# Patient Record
Sex: FEMALE | Race: WHITE | NOT HISPANIC OR LATINO
[De-identification: names, ages, dates, MRNs, and addresses within clinical notes are randomized per-mention and may not be internally consistent; named-entity substitution may affect disease eponyms.]

---

## 2019-10-08 ENCOUNTER — APPOINTMENT (OUTPATIENT)
Dept: GERIATRICS | Facility: ASSISTED LIVING FACILITY | Age: 84
End: 2019-10-08
Payer: MEDICARE

## 2019-10-08 PROCEDURE — 90686 IIV4 VACC NO PRSV 0.5 ML IM: CPT

## 2019-10-08 PROCEDURE — G0008: CPT

## 2019-10-09 ENCOUNTER — RECORD ABSTRACTING (OUTPATIENT)
Age: 84
End: 2019-10-09

## 2019-10-09 DIAGNOSIS — Z82.49 FAMILY HISTORY OF ISCHEMIC HEART DISEASE AND OTHER DISEASES OF THE CIRCULATORY SYSTEM: ICD-10-CM

## 2019-10-09 DIAGNOSIS — F17.200 NICOTINE DEPENDENCE, UNSPECIFIED, UNCOMPLICATED: ICD-10-CM

## 2019-10-09 RX ORDER — ACETAMINOPHEN 325 MG/1
325 TABLET, FILM COATED ORAL
Refills: 0 | Status: ACTIVE | COMMUNITY

## 2020-03-03 ENCOUNTER — APPOINTMENT (OUTPATIENT)
Dept: GERIATRICS | Facility: CLINIC | Age: 85
End: 2020-03-03
Payer: MEDICARE

## 2020-03-03 VITALS
TEMPERATURE: 98.9 F | DIASTOLIC BLOOD PRESSURE: 74 MMHG | BODY MASS INDEX: 23.99 KG/M2 | HEIGHT: 63 IN | SYSTOLIC BLOOD PRESSURE: 130 MMHG | OXYGEN SATURATION: 96 % | RESPIRATION RATE: 18 BRPM | HEART RATE: 68 BPM | WEIGHT: 135.4 LBS

## 2020-03-03 PROCEDURE — 99214 OFFICE O/P EST MOD 30 MIN: CPT

## 2020-03-03 RX ORDER — ATORVASTATIN CALCIUM 10 MG/1
10 TABLET, FILM COATED ORAL
Qty: 30 | Refills: 0 | Status: DISCONTINUED | COMMUNITY
Start: 2019-11-07

## 2020-03-03 RX ORDER — NITROFURANTOIN MONOHYDRATE/MACROCRYSTALLINE 25; 75 MG/1; MG/1
100 CAPSULE ORAL
Refills: 0 | Status: DISCONTINUED | COMMUNITY
End: 2020-03-03

## 2020-03-03 RX ORDER — LORATADINE 10 MG
TABLET,DISINTEGRATING ORAL
Refills: 0 | Status: DISCONTINUED | COMMUNITY
End: 2020-03-03

## 2020-03-03 NOTE — ASSESSMENT
[FreeTextEntry1] : send to ER for full eval and treatment\par Dr. Ceron made aware and in agreement\par consistent with pt's goals of care \par staff has left message for pt's hcp\par \par

## 2020-03-03 NOTE — PHYSICAL EXAM
[PERRL With Normal Accommodation] : pupils were equal in size, round, and reactive to light [Sclera] : the sclera and conjunctiva were normal [Normal Oral Mucosa] : normal oral mucosa [Outer Ear] : the ears and nose were normal in appearance [Apical Impulse] : the apical impulse was normal [Heart Rate And Rhythm] : heart rate was normal and rhythm regular [FreeTextEntry1] : dry oral mucosa

## 2020-03-03 NOTE — HISTORY OF PRESENT ILLNESS
[FreeTextEntry1] : pt has had cough x 7 days, not improving\par tested negative for Influenza\par this am very lethargic, no energy, appetite poor\par seeing pt on the memory care unit

## 2020-04-15 ENCOUNTER — APPOINTMENT (OUTPATIENT)
Dept: GERIATRICS | Facility: CLINIC | Age: 85
End: 2020-04-15
Payer: MEDICARE

## 2020-04-15 VITALS
SYSTOLIC BLOOD PRESSURE: 102 MMHG | TEMPERATURE: 98 F | DIASTOLIC BLOOD PRESSURE: 70 MMHG | RESPIRATION RATE: 18 BRPM | HEART RATE: 92 BPM

## 2020-04-15 DIAGNOSIS — Z23 ENCOUNTER FOR IMMUNIZATION: ICD-10-CM

## 2020-04-15 DIAGNOSIS — R09.89 OTHER SPECIFIED SYMPTOMS AND SIGNS INVOLVING THE CIRCULATORY AND RESPIRATORY SYSTEMS: ICD-10-CM

## 2020-04-15 NOTE — ASSESSMENT
[FreeTextEntry1] : Cleanse with NS, apply mupirocin, cover with dpd daily\par d/w Dr. Ceron and updated on same. Discussed poss pcn allergie recorded in 2/2017\par however pt on amox in 9/17 successfully treated. No pcn allergy should be noted

## 2020-04-15 NOTE — HISTORY OF PRESENT ILLNESS
[FreeTextEntry1] : incurred skin tear on 4/9 and has been using xeroform/bacitracin\par \par pt seen in her memory care unit due to adv dementia/ does not leave unit

## 2020-04-15 NOTE — PHYSICAL EXAM
[General Appearance - Alert] : alert [General Appearance - In No Acute Distress] : in no acute distress [Sclera] : the sclera and conjunctiva were normal [PERRL With Normal Accommodation] : pupils were equal in size, round, and reactive to light [Respiration, Rhythm And Depth] : normal respiratory rhythm and effort [Apical Impulse] : the apical impulse was normal [Heart Rate And Rhythm] : heart rate was normal and rhythm regular [Skin Injury 1] : Skin injury: [___cm] : a [unfilled] cm [Location] : which was located [] : on the left leg [Details] : the wound [Erythema] : erythema [Warm] : warmth [FreeTextEntry2] : warmth, serousang drainage, bruising distally to wound [FreeTextEntry3] : erythema extending 3cm proximally to wound towards knee

## 2020-04-22 ENCOUNTER — APPOINTMENT (OUTPATIENT)
Dept: GERIATRICS | Facility: CLINIC | Age: 85
End: 2020-04-22
Payer: MEDICARE

## 2020-04-22 VITALS — TEMPERATURE: 98.4 F | RESPIRATION RATE: 20 BRPM

## 2020-04-22 NOTE — ASSESSMENT
[FreeTextEntry1] : wound improving slowly.. extend oral a/b for 3 more days. Cleanse with NS, apply bactroban, cover wound with telfa,  sher and tubigrip. May remove tubigrip for cares.

## 2020-04-22 NOTE — PHYSICAL EXAM
[General Appearance - Alert] : alert [General Appearance - In No Acute Distress] : in no acute distress [___cm] : a [unfilled] cm [Location] : which was located [] : on the left leg [Details] : the wound [Erythema] : erythema [Warm] : warmth [Skin Injury 1] : Skin injury: [Sclera] : the sclera and conjunctiva were normal [PERRL With Normal Accommodation] : pupils were equal in size, round, and reactive to light [Normal Oral Mucosa] : normal oral mucosa [Full Pulse] : the pedal pulses are present [FreeTextEntry1] : no edema or pain to L foot [FreeTextEntry2] : serousang drainage, wound bed with some slough, skin flap intact to 80% of wound [FreeTextEntry3] : edema and erythema around wound, not warm, bruising has resolved

## 2020-04-27 ENCOUNTER — APPOINTMENT (OUTPATIENT)
Dept: GERIATRICS | Facility: CLINIC | Age: 85
End: 2020-04-27
Payer: MEDICARE

## 2020-04-27 VITALS
BODY MASS INDEX: 23.04 KG/M2 | HEART RATE: 80 BPM | TEMPERATURE: 97.7 F | WEIGHT: 130.06 LBS | RESPIRATION RATE: 18 BRPM | HEIGHT: 63 IN

## 2020-04-28 RX ORDER — CEPHALEXIN 500 MG/1
500 TABLET ORAL 3 TIMES DAILY
Qty: 9 | Refills: 0 | Status: DISCONTINUED | COMMUNITY
Start: 2020-04-22 | End: 2020-04-28

## 2020-04-28 RX ORDER — CEPHALEXIN 500 MG/1
500 CAPSULE ORAL EVERY 8 HOURS
Qty: 21 | Refills: 0 | Status: DISCONTINUED | COMMUNITY
Start: 2020-04-15 | End: 2020-04-28

## 2020-04-28 NOTE — ASSESSMENT
[FreeTextEntry1] : Tubigrip from toes to knee has helped\par continue daily local treatment with bactroban

## 2020-04-28 NOTE — PHYSICAL EXAM
[General Appearance - In No Acute Distress] : in no acute distress [General Appearance - Alert] : alert [Sclera] : the sclera and conjunctiva were normal [PERRL With Normal Accommodation] : pupils were equal in size, round, and reactive to light [Normal Oral Mucosa] : normal oral mucosa [Outer Ear] : the ears and nose were normal in appearance [Respiration, Rhythm And Depth] : normal respiratory rhythm and effort [Apical Impulse] : the apical impulse was normal [Heart Rate And Rhythm] : heart rate was normal and rhythm regular [Skin Injury 1] : Skin injury: [___cm] : a [unfilled] cm [Location] : which was located [] : on the left leg [Details] : the wound [Erythema] : erythema [Warm] : warmth [FreeTextEntry2] : serousang drainage, wound bed with some slough, skin flap intact to 80% of wound [FreeTextEntry3] : resolving edema and erythema

## 2020-04-28 NOTE — HISTORY OF PRESENT ILLNESS
[FreeTextEntry1] : incurred skin tear on 4/9.\par wound became infected and completed keflex from 4/16-4/26, bactroban topical treatment\par \par pt seen in her memory care unit due to adv dementia/ does not leave unit\par

## 2020-05-07 ENCOUNTER — APPOINTMENT (OUTPATIENT)
Dept: GERIATRICS | Facility: CLINIC | Age: 85
End: 2020-05-07
Payer: MEDICARE

## 2020-05-08 VITALS — TEMPERATURE: 97 F

## 2020-05-08 RX ORDER — MUPIROCIN 20 MG/G
2 OINTMENT TOPICAL DAILY
Qty: 1 | Refills: 0 | Status: DISCONTINUED | COMMUNITY
Start: 2020-04-15 | End: 2020-05-08

## 2020-05-08 NOTE — PHYSICAL EXAM
[General Appearance - Alert] : alert [General Appearance - In No Acute Distress] : in no acute distress [Respiration, Rhythm And Depth] : normal respiratory rhythm and effort [Skin Injury 1] : Skin injury: [___cm] : a [unfilled] cm [Location] : which was located [] : on the left leg [Erythema] : erythema [Details] : the wound [Warm] : warmth [FreeTextEntry3] : continues to resolve with some maceration and sl edema w erythrema to periwound [FreeTextEntry2] : nearly healed with scabbing

## 2020-05-08 NOTE — HISTORY OF PRESENT ILLNESS
[FreeTextEntry1] : requested by staff to reevaluate wound, \par wound since 4/15/20 was previously infected\par and pt had 10days Keflex with improvement noted\par

## 2020-05-08 NOTE — ASSESSMENT
[FreeTextEntry1] : d/c bactroban to LLE, now cleanse with NS, apply \par bacitracin cover with DPD, not allevyn due to too moist\par \par Pt seen in her memory care unit due to she does not leave unit

## 2020-05-29 ENCOUNTER — APPOINTMENT (OUTPATIENT)
Dept: GERIATRICS | Facility: CLINIC | Age: 85
End: 2020-05-29
Payer: MEDICARE

## 2020-06-02 NOTE — HISTORY OF PRESENT ILLNESS
[FreeTextEntry1] : wound left toe \par pt seen in her memory care unit\par does not leave due to adv dementia

## 2020-06-02 NOTE — PHYSICAL EXAM
[General Appearance - Alert] : alert [General Appearance - In No Acute Distress] : in no acute distress [General Appearance - Well Nourished] : well nourished [FreeTextEntry1] : lying in bed in her assited living room [General Appearance - Well Developed] : well developed [] : no respiratory distress [PERRL With Normal Accommodation] : pupils were equal in size, round, and reactive to light [Sclera] : the sclera and conjunctiva were normal [Apical Impulse] : the apical impulse was normal [Respiration, Rhythm And Depth] : normal respiratory rhythm and effort [Heart Rate And Rhythm] : heart rate was normal and rhythm regular [Skin Injury 1] : Skin injury: [Location] : which was located [___cm] : a [unfilled] cm [Lesions Left First Toe] : on the left great toe [Lesions Feet] : on the left foot [On The ___] : on the [unfilled] [Details] : the wound [Dry] : dry [FreeTextEntry2] : scabbed and dried, unable to fully visualize wound bed [Tender] : tenderness [FreeTextEntry3] : erythema in periwound not warm but some tenderness

## 2020-06-05 ENCOUNTER — APPOINTMENT (OUTPATIENT)
Dept: GERIATRICS | Facility: CLINIC | Age: 85
End: 2020-06-05
Payer: MEDICARE

## 2020-06-05 VITALS
SYSTOLIC BLOOD PRESSURE: 119 MMHG | HEART RATE: 66 BPM | RESPIRATION RATE: 20 BRPM | DIASTOLIC BLOOD PRESSURE: 74 MMHG | TEMPERATURE: 97.1 F

## 2020-06-05 RX ORDER — DOCUSATE SODIUM 100 MG/1
100 CAPSULE ORAL
Refills: 0 | Status: DISCONTINUED | COMMUNITY
End: 2020-06-05

## 2020-06-05 RX ORDER — MAG HYDROX/ALUMINUM HYD/SIMETH 200-200-20
200-200-20 SUSPENSION, ORAL (FINAL DOSE FORM) ORAL
Refills: 0 | Status: DISCONTINUED | COMMUNITY
End: 2020-06-05

## 2020-06-05 NOTE — ASSESSMENT
[FreeTextEntry1] : Continue same treatment;\par cleanse with NS, apply bactroban cover with dpd daily\par

## 2020-06-05 NOTE — HISTORY OF PRESENT ILLNESS
[FreeTextEntry1] : wound left toe - hs been treated with\par bactroban x 7 days\par pt seen in her memory care unit\par does not leave due to adv dementia

## 2020-06-05 NOTE — PHYSICAL EXAM
[General Appearance - Alert] : alert [General Appearance - In No Acute Distress] : in no acute distress [General Appearance - Well Nourished] : well nourished [General Appearance - Well Developed] : well developed [] : no respiratory distress [Sclera] : the sclera and conjunctiva were normal [Respiration, Rhythm And Depth] : normal respiratory rhythm and effort [Skin Injury 1] : Skin injury: [___cm] : a [unfilled] cm [Location] : which was located [Lesions Feet] : on the left foot [Lesions Left First Toe] : on the left great toe [Details] : the wound [On The ___] : on the [unfilled] [Dry] : dry [Tender] : tenderness [FreeTextEntry3] : periwound improved, no erythema or tenderness [FreeTextEntry2] : smaller and fully granulating

## 2020-07-09 RX ORDER — MULTIVITAMIN
TABLET ORAL DAILY
Qty: 30 | Refills: 0 | Status: ACTIVE | COMMUNITY
Start: 2020-06-04 | End: 1900-01-01

## 2020-10-13 ENCOUNTER — APPOINTMENT (OUTPATIENT)
Dept: GERIATRICS | Facility: CLINIC | Age: 85
End: 2020-10-13
Payer: MEDICARE

## 2020-10-13 PROCEDURE — G0008: CPT

## 2020-10-13 PROCEDURE — 90662 IIV NO PRSV INCREASED AG IM: CPT

## 2021-07-01 ENCOUNTER — APPOINTMENT (OUTPATIENT)
Dept: GERIATRICS | Facility: CLINIC | Age: 86
End: 2021-07-01
Payer: MEDICARE

## 2021-07-01 VITALS — TEMPERATURE: 96.5 F | RESPIRATION RATE: 20 BRPM | HEART RATE: 71 BPM

## 2021-07-01 DIAGNOSIS — S91.102A UNSPECIFIED OPEN WOUND OF LEFT GREAT TOE W/OUT DAMAGE TO NAIL, INITIAL ENCOUNTER: ICD-10-CM

## 2021-07-01 DIAGNOSIS — Z20.89 CONTACT WITH AND (SUSPECTED) EXPOSURE TO OTHER COMMUNICABLE DISEASES: ICD-10-CM

## 2021-07-01 RX ORDER — PERMETHRIN 50 MG/G
5 CREAM TOPICAL
Qty: 1 | Refills: 1 | Status: DISCONTINUED | COMMUNITY
Start: 2021-03-11 | End: 2021-07-01

## 2021-07-01 NOTE — HISTORY OF PRESENT ILLNESS
[FreeTextEntry1] : RLE wound - x 1 wk\par has been treated with xeroform/bacit\par \par Seen in her memory care unit, nurse present

## 2021-07-01 NOTE — PHYSICAL EXAM
[General Appearance - Alert] : alert [General Appearance - In No Acute Distress] : in no acute distress [Sclera] : the sclera and conjunctiva were normal [PERRL With Normal Accommodation] : pupils were equal in size, round, and reactive to light [Skin Injury 1] : Skin injury: [___cm] : a [unfilled] cm [Location] : which was located [Lesions Shin Right] : on the right shin [Details] : the wound [Tender] : tenderness [FreeTextEntry1] : lying in bed, in NAD [] : no respiratory distress [Respiration, Rhythm And Depth] : normal respiratory rhythm and effort [FreeTextEntry2] : 95% granulation 5% slough [FreeTextEntry3] : erythema

## 2021-07-07 ENCOUNTER — APPOINTMENT (OUTPATIENT)
Dept: GERIATRICS | Facility: CLINIC | Age: 86
End: 2021-07-07
Payer: MEDICARE

## 2021-07-07 VITALS — RESPIRATION RATE: 20 BRPM | TEMPERATURE: 97.4 F | HEART RATE: 84 BPM

## 2021-07-07 RX ORDER — CEPHALEXIN 500 MG/1
500 CAPSULE ORAL EVERY 8 HOURS
Qty: 30 | Refills: 0 | Status: COMPLETED | COMMUNITY
Start: 2021-07-07 | End: 2021-07-17

## 2021-07-07 NOTE — HISTORY OF PRESENT ILLNESS
[FreeTextEntry1] : RLE wound -since 6/20\par has been treated with bactroban for 6 days\par \par Seen in her memory care unit, nurse present

## 2021-07-07 NOTE — PHYSICAL EXAM
[General Appearance - Alert] : alert [General Appearance - In No Acute Distress] : in no acute distress [Sclera] : the sclera and conjunctiva were normal [PERRL With Normal Accommodation] : pupils were equal in size, round, and reactive to light [Respiration, Rhythm And Depth] : normal respiratory rhythm and effort [Skin Injury 1] : Skin injury: [___cm] : a [unfilled] cm [Location] : which was located [] : on the right leg [Lesions Shin Right] : on the right shin [Details] : the wound [Serosanguineous Drainage] : serosanguineous drainage [Erythema] : erythema [Tender] : tenderness [FreeTextEntry2] : 50% granulation, 50% slough [FreeTextEntry3] : darker erythema

## 2021-07-22 ENCOUNTER — APPOINTMENT (OUTPATIENT)
Dept: GERIATRICS | Facility: CLINIC | Age: 86
End: 2021-07-22
Payer: MEDICARE

## 2021-07-22 VITALS — TEMPERATURE: 97 F | RESPIRATION RATE: 20 BRPM

## 2021-07-22 DIAGNOSIS — L08.9 PERSONAL HISTORY OF OTHER INFECTIOUS AND PARASITIC DISEASES: ICD-10-CM

## 2021-07-22 DIAGNOSIS — Z86.19 PERSONAL HISTORY OF OTHER INFECTIOUS AND PARASITIC DISEASES: ICD-10-CM

## 2021-07-22 NOTE — ASSESSMENT
[FreeTextEntry1] : Cleansed wound well and removed non-viable skin\par using sterile gauze only. \par Pt tolerated procedure with assistance of nurse on floor\par \par Continue wound care: cleanse with ns, apply bactroban\par cover with dpd daily x 7 more days\par \par f/u with dr. huggins when in unit

## 2021-07-22 NOTE — HISTORY OF PRESENT ILLNESS
[FreeTextEntry1] : RLE wound -since 6/20\par had a course of Keflex and currently being treated with bactroban for past 14 days\par \par Seen in her memory care unit, nurse present

## 2021-07-22 NOTE — PHYSICAL EXAM
[General Appearance - Alert] : alert [General Appearance - In No Acute Distress] : in no acute distress [Sclera] : the sclera and conjunctiva were normal [PERRL With Normal Accommodation] : pupils were equal in size, round, and reactive to light [Skin Injury 1] : Skin injury: [___cm] : a [unfilled] cm [Location] : which was located [] : on the right leg [Lesions Shin Right] : on the right shin [Details] : the wound [Serosanguineous Drainage] : serosanguineous drainage [Tender] : tenderness [FreeTextEntry2] : 75% granulation, 25% slough [FreeTextEntry3] : improved periwound

## 2021-10-28 ENCOUNTER — APPOINTMENT (OUTPATIENT)
Dept: GERIATRICS | Facility: CLINIC | Age: 86
End: 2021-10-28
Payer: MEDICARE

## 2021-10-28 PROCEDURE — G0008: CPT

## 2021-10-28 PROCEDURE — 90662 IIV NO PRSV INCREASED AG IM: CPT

## 2021-10-29 ENCOUNTER — MED ADMIN CHARGE (OUTPATIENT)
Age: 86
End: 2021-10-29

## 2021-11-12 ENCOUNTER — APPOINTMENT (OUTPATIENT)
Dept: GERIATRICS | Facility: CLINIC | Age: 86
End: 2021-11-12
Payer: MEDICARE

## 2021-11-12 DIAGNOSIS — Z23 ENCOUNTER FOR IMMUNIZATION: ICD-10-CM

## 2021-11-12 DIAGNOSIS — M19.90 UNSPECIFIED OSTEOARTHRITIS, UNSPECIFIED SITE: ICD-10-CM

## 2021-11-15 VITALS — TEMPERATURE: 98 F | RESPIRATION RATE: 20 BRPM | HEART RATE: 61 BPM

## 2021-11-15 PROBLEM — Z23 ENCOUNTER FOR IMMUNIZATION: Status: RESOLVED | Noted: 2020-10-13 | Resolved: 2021-11-15

## 2021-11-15 PROBLEM — M19.90 OSTEOARTHRITIS: Status: ACTIVE | Noted: 2021-11-15

## 2021-11-15 RX ORDER — MUPIROCIN 20 MG/G
2 OINTMENT TOPICAL DAILY
Qty: 1 | Refills: 1 | Status: DISCONTINUED | COMMUNITY
Start: 2020-06-02 | End: 2021-11-15

## 2021-11-15 NOTE — ASSESSMENT
[FreeTextEntry1] : possible arthritic flare? \par Pt seen in her memory care home d/t pt's adv dementia,

## 2021-11-15 NOTE — HISTORY OF PRESENT ILLNESS
[FreeTextEntry1] : pt has been limping for 2-3 days on her unit and\par has change in ambulation\par no known trauma, has been c/o RLE discomfort to\par nursing staff who did not notice any wound or bruising\par \par \par

## 2021-11-15 NOTE — PHYSICAL EXAM
[Alert] : alert [No Acute Distress] : in no acute distress [Normal Outer Ear/Nose] : the ears and nose were normal in appearance [Normal Appearance] : the appearance of the neck was normal [Supple] : the neck was supple [No Respiratory Distress] : no respiratory distress [No Acc Muscle Use] : no accessory muscle use [Respiration, Rhythm And Depth] : normal respiratory rhythm and effort [Auscultation Breath Sounds / Voice Sounds] : lungs were clear to auscultation bilaterally [Heart Rate And Rhythm] : heart rate was normal and rhythm regular [Abdomen Soft] : soft [No Spinal Tenderness] : no spinal tenderness [Normal Color / Pigmentation] : normal skin color and pigmentation [Normal Turgor] : normal skin turgor [No Focal Deficits] : no focal deficits [Normal Affect] : the affect was normal [Normal Mood] : the mood was normal [Normal Gait] : normal gait [de-identified] : no pain on palpation of hips, or extremities, no obvious deformities. After exam, patient was able to ambulate to main room with no apparent pain or problem

## 2021-11-16 ENCOUNTER — APPOINTMENT (OUTPATIENT)
Dept: GERIATRICS | Facility: CLINIC | Age: 86
End: 2021-11-16
Payer: MEDICARE

## 2021-11-16 ENCOUNTER — NON-APPOINTMENT (OUTPATIENT)
Age: 86
End: 2021-11-16

## 2021-11-16 VITALS
RESPIRATION RATE: 18 BRPM | TEMPERATURE: 98 F | SYSTOLIC BLOOD PRESSURE: 101 MMHG | DIASTOLIC BLOOD PRESSURE: 70 MMHG | HEART RATE: 71 BPM

## 2021-11-17 NOTE — ASSESSMENT
[FreeTextEntry1] : continue tylenol 650mg po tid x 7 more days\par \par encourage fluids and resume ordinary activies

## 2021-11-17 NOTE — HISTORY OF PRESENT ILLNESS
[FreeTextEntry1] : Pt stayed in bed on 11/15 and ate poorly\par today she is out of bed, still with change in \par ambulation (with rollator) and appearing fatigued.\par \par Seen in her memory unit due to adv dementia

## 2021-12-06 ENCOUNTER — NON-APPOINTMENT (OUTPATIENT)
Age: 86
End: 2021-12-06

## 2021-12-06 ENCOUNTER — APPOINTMENT (OUTPATIENT)
Dept: GERIATRICS | Facility: CLINIC | Age: 86
End: 2021-12-06
Payer: MEDICARE

## 2021-12-06 VITALS
RESPIRATION RATE: 20 BRPM | SYSTOLIC BLOOD PRESSURE: 113 MMHG | DIASTOLIC BLOOD PRESSURE: 79 MMHG | OXYGEN SATURATION: 97 % | HEART RATE: 79 BPM

## 2021-12-06 RX ORDER — ACETAMINOPHEN 325 MG/1
325 TABLET, FILM COATED ORAL EVERY 6 HOURS
Qty: 24 | Refills: 0 | Status: COMPLETED | OUTPATIENT
Start: 2021-12-06 | End: 2021-12-09

## 2021-12-08 ENCOUNTER — APPOINTMENT (OUTPATIENT)
Dept: GERIATRICS | Facility: CLINIC | Age: 86
End: 2021-12-08
Payer: MEDICARE

## 2021-12-08 VITALS
HEIGHT: 63 IN | BODY MASS INDEX: 21.97 KG/M2 | SYSTOLIC BLOOD PRESSURE: 125 MMHG | HEART RATE: 78 BPM | TEMPERATURE: 97.7 F | DIASTOLIC BLOOD PRESSURE: 78 MMHG | WEIGHT: 124 LBS | RESPIRATION RATE: 20 BRPM

## 2021-12-08 NOTE — ASSESSMENT
[FreeTextEntry1] : Keep socks on and cover patient\par Legs elevated as much as possible\par US/venous doppler bilater le ordered\par may benefit from light compression pending\par results of doppler.\par \par d/w pcp Dr. Ceron

## 2021-12-08 NOTE — HISTORY OF PRESENT ILLNESS
[FreeTextEntry1] : pain and swelling of feet since lunchtime\par additionally, pt started limping\par Nurse concerned due to coldness of feet \par Pt does have a history of le edema\par \par No chest pain, no palpitations, no loss of appetite\par no c/o of abdominal pain\par \par Pt seen on her memory care unit due to she does not\par leave unit for dementia

## 2021-12-08 NOTE — PHYSICAL EXAM
[de-identified] : bilateral le edema L pedal pulse wnl, R pedal pulse difficult to palpate [de-identified] : hemisiderin changes in le, shiny thin skin, feet pale, no bluish tinge

## 2021-12-09 NOTE — PHYSICAL EXAM
[Alert] : alert [Normal Outer Ear/Nose] : the ears and nose were normal in appearance [Normal Appearance] : the appearance of the neck was normal [Supple] : the neck was supple [No Respiratory Distress] : no respiratory distress [Respiration, Rhythm And Depth] : normal respiratory rhythm and effort [Heart Rate And Rhythm] : heart rate was normal and rhythm regular [Pitting Edema] : pitting edema present [___ +] : bilateral [unfilled]+ pretibial pitting edema [No Spinal Tenderness] : no spinal tenderness [No Focal Deficits] : no focal deficits [Normal Affect] : the affect was normal [Normal Mood] : the mood was normal [de-identified] : bilateral le edema improved with significantly less fluid in dorsum of bilateral feet.  [de-identified] : hemisiderin changes in le, shiny thin skin, feet pale and warm

## 2021-12-09 NOTE — HISTORY OF PRESENT ILLNESS
[FreeTextEntry1] : follow up edema\par pt had US as ordered and negative for dvt bilaterally\par Pt has been elevating legs and taking tylenol standing dose\par Legs becoming edematous when pt walking and sitting\par

## 2021-12-09 NOTE — ASSESSMENT
[FreeTextEntry1] : Pt still limping but less pain expressed and taking tylenol prn\par Instructed nurses to use light compression with tubigrip size E or F\par on in am off qhs\par Apply A&D ointment to both lower extremities\par \par Seen in her memory unit as she does not leave unit

## 2022-04-14 ENCOUNTER — TRANSCRIPTION ENCOUNTER (OUTPATIENT)
Age: 87
End: 2022-04-14

## 2022-06-09 ENCOUNTER — NON-APPOINTMENT (OUTPATIENT)
Age: 87
End: 2022-06-09

## 2022-06-09 ENCOUNTER — APPOINTMENT (OUTPATIENT)
Dept: GERIATRICS | Facility: CLINIC | Age: 87
End: 2022-06-09
Payer: MEDICARE

## 2022-06-09 VITALS
BODY MASS INDEX: 21.48 KG/M2 | SYSTOLIC BLOOD PRESSURE: 110 MMHG | WEIGHT: 121.25 LBS | TEMPERATURE: 96.8 F | DIASTOLIC BLOOD PRESSURE: 66 MMHG | HEART RATE: 88 BPM | HEIGHT: 63 IN | RESPIRATION RATE: 20 BRPM

## 2022-06-09 VITALS
SYSTOLIC BLOOD PRESSURE: 110 MMHG | HEART RATE: 88 BPM | TEMPERATURE: 98.8 F | DIASTOLIC BLOOD PRESSURE: 66 MMHG | RESPIRATION RATE: 20 BRPM

## 2022-06-09 DIAGNOSIS — M79.671 PAIN IN RIGHT FOOT: ICD-10-CM

## 2022-06-09 DIAGNOSIS — M79.672 PAIN IN LEFT FOOT: ICD-10-CM

## 2022-06-09 DIAGNOSIS — R53.81 OTHER MALAISE: ICD-10-CM

## 2022-06-09 DIAGNOSIS — R53.83 OTHER MALAISE: ICD-10-CM

## 2022-06-09 DIAGNOSIS — M79.672 PAIN IN RIGHT FOOT: ICD-10-CM

## 2022-06-10 NOTE — HISTORY OF PRESENT ILLNESS
[FreeTextEntry1] : Wound incurred on RLE on 5/27\par has been treated with bacitracin and \par xeroform daily\par Pt w fragile skin, adv age and adv dementia\par lives in memory care unit.

## 2022-06-10 NOTE — ASSESSMENT
[FreeTextEntry1] : cleanse w NS, apply bacitracin cover w bandaid daily x 5 days\par then d/c treatment - \par nursing staff informed\par Pt seen on her memory care unit/ does not leave unit/homebound

## 2022-07-22 ENCOUNTER — APPOINTMENT (OUTPATIENT)
Dept: GERIATRICS | Facility: CLINIC | Age: 87
End: 2022-07-22

## 2022-07-22 ENCOUNTER — NON-APPOINTMENT (OUTPATIENT)
Age: 87
End: 2022-07-22

## 2022-07-22 VITALS
SYSTOLIC BLOOD PRESSURE: 109 MMHG | HEART RATE: 78 BPM | RESPIRATION RATE: 20 BRPM | DIASTOLIC BLOOD PRESSURE: 55 MMHG | TEMPERATURE: 98.3 F

## 2022-07-22 VITALS
DIASTOLIC BLOOD PRESSURE: 55 MMHG | HEART RATE: 78 BPM | BODY MASS INDEX: 19.93 KG/M2 | WEIGHT: 112.5 LBS | SYSTOLIC BLOOD PRESSURE: 109 MMHG | HEIGHT: 63 IN | RESPIRATION RATE: 20 BRPM | TEMPERATURE: 98.3 F

## 2022-07-22 DIAGNOSIS — I73.9 PERIPHERAL VASCULAR DISEASE, UNSPECIFIED: ICD-10-CM

## 2022-07-22 DIAGNOSIS — R60.9 EDEMA, UNSPECIFIED: ICD-10-CM

## 2022-07-22 RX ORDER — MIRTAZAPINE 30 MG/1
30 TABLET, FILM COATED ORAL
Refills: 0 | Status: ACTIVE | COMMUNITY
Start: 2022-07-22

## 2022-07-22 RX ORDER — MIRTAZAPINE 15 MG/1
15 TABLET, FILM COATED ORAL
Qty: 30 | Refills: 0 | Status: DISCONTINUED | COMMUNITY
Start: 2019-11-27 | End: 2022-07-22

## 2022-07-25 NOTE — ASSESSMENT
[FreeTextEntry1] : RLE wound: Cleanse w NS, apply bacitracin, cover with dpd daily\par bilateral legs: d/c tubigrip. Apply ACE wrap to both legs from toes to knees qam, remove qhs\par keep legs elevated as much as tolerated\par \par Pt with dementia and does not leave her memory unit

## 2022-07-25 NOTE — HISTORY OF PRESENT ILLNESS
[FreeTextEntry1] : chronic venous insufficiency\par now worse with increased edema\par as per nurse\par wound on RLE\par \par seen in her memory care unit due to adv dementia\par does not leave unit 2

## 2022-07-25 NOTE — PHYSICAL EXAM
[de-identified] : sitting in w/c, on and off crying (at baseline) [de-identified] : 0.5x1 skin tear on R upper outer calf, mod drainage, periwound with edema

## 2022-10-11 ENCOUNTER — APPOINTMENT (OUTPATIENT)
Dept: GERIATRICS | Facility: CLINIC | Age: 87
End: 2022-10-11

## 2022-10-11 DIAGNOSIS — S81.801A UNSPECIFIED OPEN WOUND, RIGHT LOWER LEG, INITIAL ENCOUNTER: ICD-10-CM

## 2022-10-11 NOTE — HISTORY OF PRESENT ILLNESS
[FreeTextEntry1] : skin tear over purpura x 1 day\par please evaluate and provide orders\par \par Seen in her memory care unit as she does not\par leave unit/adv. dementia

## 2022-10-11 NOTE — ASSESSMENT
[FreeTextEntry1] : cleanse w ns, apply bacitracin, xeroform cover with island dressing\par every other day x 14 d

## 2022-10-11 NOTE — PHYSICAL EXAM
[Alert] : alert [No Acute Distress] : in no acute distress [Normal Appearance] : the appearance of the neck was normal [No Respiratory Distress] : no respiratory distress [Respiration, Rhythm And Depth] : normal respiratory rhythm and effort [Normal S1, S2] : normal S1 and S2 [Heart Rate And Rhythm] : heart rate was normal and rhythm regular [Pitting Edema] : pitting edema present [___ +] : bilateral [unfilled]+ pretibial pitting edema [No Spinal Tenderness] : no spinal tenderness [No Focal Deficits] : no focal deficits [Normal Affect] : the affect was normal [Normal Mood] : the mood was normal [de-identified] : lying in bed, on and off crying (at baseline) [de-identified] : 1x2 skin tear on L anterior mid thigh, periwound with purpura

## 2022-11-08 ENCOUNTER — APPOINTMENT (OUTPATIENT)
Dept: GERIATRICS | Facility: CLINIC | Age: 87
End: 2022-11-08

## 2022-11-08 VITALS
HEART RATE: 73 BPM | WEIGHT: 109 LBS | BODY MASS INDEX: 19.31 KG/M2 | HEIGHT: 63 IN | RESPIRATION RATE: 20 BRPM | TEMPERATURE: 98.3 F

## 2022-11-08 DIAGNOSIS — S81.812A LACERATION W/OUT FOREIGN BODY, LEFT LOWER LEG, INITIAL ENCOUNTER: ICD-10-CM

## 2022-11-08 DIAGNOSIS — R60.0 LOCALIZED EDEMA: ICD-10-CM

## 2022-11-09 NOTE — ASSESSMENT
[FreeTextEntry1] : No pain in Right leg on palpation\par Change ACE wraps to Tubigrip on qam off qhs \par to reduce pressure on skin of LE, elevate legs as much as possible\par \par UA/C&S if possible to get sample via HAT in toilet\par \par pt seen in her memory unit due to does not leave unit

## 2022-11-09 NOTE — PHYSICAL EXAM
[Alert] : alert [Normal Appearance] : the appearance of the neck was normal [No Respiratory Distress] : no respiratory distress [Respiration, Rhythm And Depth] : normal respiratory rhythm and effort [Normal S1, S2] : normal S1 and S2 [Heart Rate And Rhythm] : heart rate was normal and rhythm regular [Pitting Edema] : pitting edema present [___ +] : bilateral [unfilled]+ pretibial pitting edema [No Spinal Tenderness] : no spinal tenderness [No Focal Deficits] : no focal deficits [Normal Affect] : the affect was normal [Normal Mood] : the mood was normal [de-identified] : strong odor of urine/ malodor [de-identified] : no deformity no crepitus no pain on palpation of hips, thighs [de-identified] : 14x7 ecchymosis on RLE purplish colored with central small hematoma. LLE at shin with senile purpura and coloration changes

## 2022-11-15 ENCOUNTER — APPOINTMENT (OUTPATIENT)
Dept: GERIATRICS | Facility: CLINIC | Age: 87
End: 2022-11-15

## 2022-11-18 ENCOUNTER — APPOINTMENT (OUTPATIENT)
Dept: GERIATRICS | Facility: CLINIC | Age: 87
End: 2022-11-18

## 2022-11-18 DIAGNOSIS — R23.4 CHANGES IN SKIN TEXTURE: ICD-10-CM

## 2022-11-21 PROBLEM — R23.4 CHANGES IN SKIN TEXTURE: Status: ACTIVE | Noted: 2020-04-15

## 2022-11-21 NOTE — ASSESSMENT
[FreeTextEntry1] : cleanse w ns apply bacitracin and xeroform, cover w dpd daily\par seen on her memory unit due to homebound status.

## 2022-11-21 NOTE — PHYSICAL EXAM
[Alert] : alert [Normal Appearance] : the appearance of the neck was normal [No Respiratory Distress] : no respiratory distress [Respiration, Rhythm And Depth] : normal respiratory rhythm and effort [Normal S1, S2] : normal S1 and S2 [Heart Rate And Rhythm] : heart rate was normal and rhythm regular [Pitting Edema] : pitting edema present [___ +] : bilateral [unfilled]+ pretibial pitting edema [No Spinal Tenderness] : no spinal tenderness [No Focal Deficits] : no focal deficits [Normal Affect] : the affect was normal [Normal Mood] : the mood was normal [de-identified] : LLE skin tear at upper outer anterior le just distal to knee over purpura 2x1x0.1, skin flap not present, fully granulating, scant sang drainage

## 2022-12-09 ENCOUNTER — APPOINTMENT (OUTPATIENT)
Dept: GERIATRICS | Facility: CLINIC | Age: 87
End: 2022-12-09

## 2022-12-09 ENCOUNTER — NON-APPOINTMENT (OUTPATIENT)
Age: 87
End: 2022-12-09

## 2022-12-09 VITALS
HEART RATE: 83 BPM | BODY MASS INDEX: 19.31 KG/M2 | HEIGHT: 63 IN | DIASTOLIC BLOOD PRESSURE: 85 MMHG | OXYGEN SATURATION: 94 % | TEMPERATURE: 98.3 F | WEIGHT: 109 LBS | RESPIRATION RATE: 18 BRPM | SYSTOLIC BLOOD PRESSURE: 132 MMHG

## 2022-12-09 DIAGNOSIS — R13.10 DYSPHAGIA, UNSPECIFIED: ICD-10-CM

## 2022-12-09 DIAGNOSIS — T14.8XXA OTHER INJURY OF UNSPECIFIED BODY REGION, INITIAL ENCOUNTER: ICD-10-CM

## 2022-12-09 DIAGNOSIS — U07.1 COVID-19: ICD-10-CM

## 2022-12-09 DIAGNOSIS — R53.1 WEAKNESS: ICD-10-CM

## 2022-12-09 DIAGNOSIS — Z00.00 ENCOUNTER FOR GENERAL ADULT MEDICAL EXAMINATION W/OUT ABNORMAL FINDINGS: ICD-10-CM

## 2022-12-09 DIAGNOSIS — F03.90 UNSPECIFIED DEMENTIA W/OUT BEHAVIORAL DISTURBANCE: ICD-10-CM

## 2022-12-09 DIAGNOSIS — S81.802A UNSPECIFIED OPEN WOUND, LEFT LOWER LEG, INITIAL ENCOUNTER: ICD-10-CM

## 2022-12-09 PROCEDURE — 99213 OFFICE O/P EST LOW 20 MIN: CPT | Mod: CS

## 2022-12-09 RX ORDER — FAMOTIDINE 20 MG/1
20 TABLET, FILM COATED ORAL
Refills: 0 | Status: DISCONTINUED | COMMUNITY
Start: 2022-07-22 | End: 2022-12-09

## 2022-12-12 PROBLEM — U07.1 CLINICAL DIAGNOSIS OF COVID-19: Status: ACTIVE | Noted: 2022-12-09

## 2022-12-12 PROBLEM — R13.10 DYSPHAGIA: Status: ACTIVE | Noted: 2022-12-09

## 2022-12-12 PROBLEM — R53.1 WEAKNESS: Status: ACTIVE | Noted: 2022-10-20

## 2022-12-12 NOTE — PHYSICAL EXAM
[de-identified] : lying in bed, weak [de-identified] : mucous in mouth/ spitting out chronic [de-identified] : LLE skin tear at upper outer anterior le just distal to knee over purpura 2x1x0.1, skin flap not present, fully granulating, scant sang drainage

## 2022-12-12 NOTE — HISTORY OF PRESENT ILLNESS
[FreeTextEntry1] : Pt is very weak, unable to sit up in her w/c\par and shaking\par has not been eating any solid food, does drink \par ensure and other liquids\par s/p covid infection\par no cough, no congestion, no sob\par \par seen in her memory care unit as she is homebound

## 2022-12-12 NOTE — ASSESSMENT
[FreeTextEntry1] : speech language referral for dysphagia, swallowing eval\par OT for w/c eval -chair supports due to pt weaknesss\par CMP and CBC on 12/14\par \par d/w her pcp Dr. Ceron